# Patient Record
Sex: MALE | Race: OTHER | NOT HISPANIC OR LATINO | URBAN - METROPOLITAN AREA
[De-identification: names, ages, dates, MRNs, and addresses within clinical notes are randomized per-mention and may not be internally consistent; named-entity substitution may affect disease eponyms.]

---

## 2017-02-02 ENCOUNTER — OUTPATIENT (OUTPATIENT)
Dept: OUTPATIENT SERVICES | Facility: HOSPITAL | Age: 62
LOS: 1 days | Discharge: HOME | End: 2017-02-02

## 2017-06-27 DIAGNOSIS — Z01.818 ENCOUNTER FOR OTHER PREPROCEDURAL EXAMINATION: ICD-10-CM

## 2017-06-27 DIAGNOSIS — E78.5 HYPERLIPIDEMIA, UNSPECIFIED: ICD-10-CM

## 2017-06-27 DIAGNOSIS — E11.9 TYPE 2 DIABETES MELLITUS WITHOUT COMPLICATIONS: ICD-10-CM

## 2017-06-27 DIAGNOSIS — I10 ESSENTIAL (PRIMARY) HYPERTENSION: ICD-10-CM

## 2019-05-30 ENCOUNTER — APPOINTMENT (OUTPATIENT)
Dept: UROLOGY | Facility: CLINIC | Age: 64
End: 2019-05-30
Payer: COMMERCIAL

## 2019-05-30 DIAGNOSIS — R97.20 ELEVATED PROSTATE, SPECIFIC ANTIGEN [PSA]: ICD-10-CM

## 2019-05-30 DIAGNOSIS — N13.8 BENIGN PROSTATIC HYPERPLASIA WITH LOWER URINARY TRACT SYMPMS: ICD-10-CM

## 2019-05-30 DIAGNOSIS — N40.1 BENIGN PROSTATIC HYPERPLASIA WITH LOWER URINARY TRACT SYMPMS: ICD-10-CM

## 2019-05-30 PROBLEM — Z00.00 ENCOUNTER FOR PREVENTIVE HEALTH EXAMINATION: Status: ACTIVE | Noted: 2019-05-30

## 2019-05-30 PROCEDURE — 99243 OFF/OP CNSLTJ NEW/EST LOW 30: CPT

## 2019-05-31 PROBLEM — N40.1 BENIGN LOCALIZED HYPERPLASIA OF PROSTATE WITH URINARY OBSTRUCTION: Status: ACTIVE | Noted: 2019-05-31

## 2019-05-31 NOTE — HISTORY OF PRESENT ILLNESS
[Urinary Urgency] : urinary urgency [Urinary Frequency] : urinary frequency [Nocturia] : nocturia [Straining] : straining [FreeTextEntry1] : 64 yo with PSA elevation \par 4.74 in 2017\par 6.1 in 2019 \par \par he has never undergone a biopsy\par \par \par IPSS 17 (on Tamsulosin)\par IVON 20\par  [Hematuria - Gross] : no gross hematuria [Hematuria - Microscopic] : no microscopic hematuria

## 2019-05-31 NOTE — ASSESSMENT
[FreeTextEntry1] : 62 yo with elevated PSA\par no prior biopsy\par \par discussed benefits of 4k and 3T multiparametric MRI in order to determine need for biopsy and to direct biopsy\par all questions answered\par f/u in 2 weeks with above

## 2019-05-31 NOTE — PHYSICAL EXAM
[General Appearance - Well Developed] : well developed [General Appearance - Well Nourished] : well nourished [] : no respiratory distress [Normal Station and Gait] : the gait and station were normal for the patient's age [No Focal Deficits] : no focal deficits [Oriented To Time, Place, And Person] : oriented to person, place, and time [Not Anxious] : not anxious

## 2019-06-04 ENCOUNTER — OTHER (OUTPATIENT)
Age: 64
End: 2019-06-04

## 2019-06-17 ENCOUNTER — APPOINTMENT (OUTPATIENT)
Dept: UROLOGY | Facility: CLINIC | Age: 64
End: 2019-06-17
Payer: COMMERCIAL

## 2019-06-17 VITALS
HEIGHT: 70 IN | DIASTOLIC BLOOD PRESSURE: 91 MMHG | WEIGHT: 215 LBS | SYSTOLIC BLOOD PRESSURE: 148 MMHG | BODY MASS INDEX: 30.78 KG/M2 | HEART RATE: 60 BPM

## 2019-06-17 DIAGNOSIS — R93.49 ABNORMAL RADIOLOGIC FINDINGS ON DIAGNOSITIC IMAGING OF OTHER URINARY ORGANS: ICD-10-CM

## 2019-06-17 DIAGNOSIS — R97.20 ELEVATED PROSTATE, SPECIFIC ANTIGEN [PSA]: ICD-10-CM

## 2019-06-17 PROCEDURE — 99214 OFFICE O/P EST MOD 30 MIN: CPT

## 2019-06-17 NOTE — LETTER BODY
[Dear  ___] : Dear ~ULI, [Courtesy Letter:] : I had the pleasure of seeing your patient, [unfilled], in my office today. [Please see my note below.] : Please see my note below. [Consult Closing:] : Thank you very much for allowing me to participate in the care of this patient.  If you have any questions, please do not hesitate to contact me. [Sincerely,] : Sincerely, [FreeTextEntry3] : Connie Carter MD, FACS\par

## 2019-06-17 NOTE — HISTORY OF PRESENT ILLNESS
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

## 2019-06-17 NOTE — PHYSICAL EXAM
[General Appearance - Well Developed] : well developed [General Appearance - Well Nourished] : well nourished [] : no respiratory distress [Oriented To Time, Place, And Person] : oriented to person, place, and time [Not Anxious] : not anxious [Normal Station and Gait] : the gait and station were normal for the patient's age [No Focal Deficits] : no focal deficits

## 2019-06-17 NOTE — ASSESSMENT
[FreeTextEntry1] : 64 yo with history of elevated PSA and PIRADS -3 \par \par discussed the MRI findings and the need for a biopsy \par I discussed the role of an MRI guided biopsy\par I discussed the need for fusion software and the risks and benefits of a random vs fusion biopsy\par \par he will f/u with [facility] for the fusion biopsy\par \par i am awaiting his 4k - blood obtained but no results provided\par once available I will discuss it with the patient \par \par

## 2019-06-27 LAB
4K SCORE CALCULATION: 32 %
FREE PSA: 0.55 NG/ML
PERCENT FREE PSA: 11 %
TOTAL PSA: 4.86 NG/ML

## 2020-01-16 ENCOUNTER — RX RENEWAL (OUTPATIENT)
Age: 65
End: 2020-01-16

## 2020-09-15 ENCOUNTER — RX RENEWAL (OUTPATIENT)
Age: 65
End: 2020-09-15

## 2020-09-22 RX ORDER — TAMSULOSIN HYDROCHLORIDE 0.4 MG/1
0.4 CAPSULE ORAL
Qty: 90 | Refills: 3 | Status: ACTIVE | COMMUNITY
Start: 2019-05-31 | End: 1900-01-01